# Patient Record
Sex: MALE | Race: WHITE | URBAN - METROPOLITAN AREA
[De-identification: names, ages, dates, MRNs, and addresses within clinical notes are randomized per-mention and may not be internally consistent; named-entity substitution may affect disease eponyms.]

---

## 2023-04-29 ENCOUNTER — OFFICE VISIT (OUTPATIENT)
Dept: FAMILY MEDICINE CLINIC | Facility: CLINIC | Age: 43
End: 2023-04-29
Payer: COMMERCIAL

## 2023-04-29 VITALS
HEART RATE: 71 BPM | SYSTOLIC BLOOD PRESSURE: 124 MMHG | HEIGHT: 67 IN | BODY MASS INDEX: 29.82 KG/M2 | DIASTOLIC BLOOD PRESSURE: 76 MMHG | WEIGHT: 190 LBS | OXYGEN SATURATION: 95 % | TEMPERATURE: 97 F | RESPIRATION RATE: 18 BRPM

## 2023-04-29 DIAGNOSIS — H10.32 ACUTE CONJUNCTIVITIS OF LEFT EYE, UNSPECIFIED ACUTE CONJUNCTIVITIS TYPE: Primary | ICD-10-CM

## 2023-04-29 PROCEDURE — 3074F SYST BP LT 130 MM HG: CPT | Performed by: NURSE PRACTITIONER

## 2023-04-29 PROCEDURE — 3008F BODY MASS INDEX DOCD: CPT | Performed by: NURSE PRACTITIONER

## 2023-04-29 PROCEDURE — 99203 OFFICE O/P NEW LOW 30 MIN: CPT | Performed by: NURSE PRACTITIONER

## 2023-04-29 PROCEDURE — 3078F DIAST BP <80 MM HG: CPT | Performed by: NURSE PRACTITIONER

## 2023-04-29 RX ORDER — TOBRAMYCIN 3 MG/ML
SOLUTION/ DROPS OPHTHALMIC
Qty: 1 EACH | Refills: 0 | Status: SHIPPED | OUTPATIENT
Start: 2023-04-29

## 2023-05-23 ENCOUNTER — HOSPITAL ENCOUNTER (OUTPATIENT)
Dept: CT IMAGING | Age: 43
Discharge: HOME | End: 2023-05-23
Attending: STUDENT IN AN ORGANIZED HEALTH CARE EDUCATION/TRAINING PROGRAM

## 2023-05-23 DIAGNOSIS — Z13.6 SCREENING FOR HEART DISEASE: ICD-10-CM

## 2023-05-23 LAB
POCT GLUCOSE CHOLESTECH: 90 (ref 70–140)
POCT HDL: 43 (ref 45–60)
POCT LDL: 125 (ref 0–99)
POCT TOTAL CHOLESTEROL: 181 (ref 110–200)
POCT TRIGLYCERIDES: 62 (ref 1–149)

## 2023-05-23 NOTE — PROGRESS NOTES
Date of Service 5/23/2023    WINSOME VAUGHN  Date of Birth 7/22/1980    Patient Age: 40year old    PCP: No primary care provider on file. Consult Type  Type Scan/Screening: Heart Scan  Preliminary Heart Scan Score: 14.1                Body Mass Index  There is no height or weight on file to calculate BMI. Lipid Profile  No Lipid results on file in last 5 years . Today - Nonfasting  Total - 181  LDL - 125  HDL - 43  Triglycerides - 62  Glucose 90 (Nonfasting)    Not on cholesterol med. Nurse Review  Risk factor information and results reviewed with Nurse: Yes     /76 no med. Recommended Follow Up:  Consult your physician regarding[de-identified] Final Heart Scan Report; Discuss potential for Incidental Finding    No data recorded      Recommendations for Change:  Nutrition Changes: Low Saturated Fat;Low Fat Dairy; Increase Fiber  Cholesterol Modification (goal of therapy depends upon your risk): Increase HDL (Healthy/Good) Normal >45 Men >55 Women;Decrease LDL (Lousy/Bad) Ideal <100  Exercise: No Change Needed  Smoking Cessation: No Change Needed     Stress Management: Adopt Stress Management Techniques  Repeat Heart Scan: 3 Years if Calcium Score is > 0. 0; Discuss with your Physician          Master Recommended Resources:  Recommended Resources: Upcoming Classes, Medical Services and Health Library [email or web address] Isai Israel RN        Please Contact the Nurse Heart Line with any Questions or Concerns [phone].